# Patient Record
(demographics unavailable — no encounter records)

---

## 2024-12-04 NOTE — REVIEW OF SYSTEMS
[Negative] : Musculoskeletal [Rash] : no rash noted [Ulcer] : no ulcer was seen [Syncope] : no syncope noted [Neuropathy] : no neuropathy [Depression] : no depression [Diabetes] : diabetes mellitus [Abn Vag Bleeding] : abnormal vaginal bleeding [Incontinence] : no incontinence [Fatigue] : no fatigue [Recent Wt Gain ___ Lbs] : no recent weight gain [Recent Wt Loss___ Lbs] : recent weight loss of [unfilled] lbs [Chest Pain] : no chest pain [DIXON] : no dyspnea on exertion [Wheezing] : no wheezing [SOB on Exertion] : no shortness of breath during exertion [Bloody Stools] : no bloody stools [Nausea] : no nausea/vomitting [Muscle Weakness] : no muscle weakness [FreeTextEntry4] : + USI wears pads somewhat worse

## 2024-12-04 NOTE — DISCUSSION/SUMMARY
[Reviewed Clinical Lab Test(s)] : Results of clinical tests were reviewed. [Reviewed Radiology Report(s)] : Radiology reports were reviewed. [Visit Time ___ Minutes] : [unfilled] minutes [Face to Face Time___ Minutes] : with [unfilled] minutes in face to face consultation. [FreeTextEntry1] : Excellent overall prognosis. Has significant USI Sx will refer to urogyn Answered all her questions   Return in 3 months

## 2024-12-04 NOTE — PHYSICAL EXAM
[Absent] : Adnexa(ae): Absent [Normal] : Recto-Vaginal Exam: Normal [FreeTextEntry1] : NAD [de-identified] : LUIS CARLOS [de-identified] : no edema [de-identified] : soft NT/ND  [de-identified] : cuff intact, no lesions small polyp on the R side cauderized [de-identified] : no masses [Fully active, able to carry on all pre-disease performance without restriction] : Status 0 - Fully active, able to carry on all pre-disease performance without restriction

## 2024-12-04 NOTE — HISTORY OF PRESENT ILLNESS
[FreeTextEntry1] : 63 y/o with stage IA2 G2 endometrial cancer  Bere/BSO/PSLNBx on 9/25/23 dMMR (with methylation) p53 WT, no adjuvant therapy.  No complaints

## 2025-03-11 NOTE — PHYSICAL EXAM
[Absent] : Adnexa(ae): Absent [Normal] : Recto-Vaginal Exam: Normal [FreeTextEntry1] : NAD [de-identified] : LUIS CARLOS [de-identified] : no edema [de-identified] : soft NT/ND  [de-identified] : cuff intact, no lesions small polyp on the R side cauderized [de-identified] : no masses

## 2025-03-11 NOTE — REVIEW OF SYSTEMS
[Negative] : Musculoskeletal [Diabetes] : diabetes mellitus [Abn Vag Bleeding] : abnormal vaginal bleeding [Recent Wt Loss___ Lbs] : recent weight loss of [unfilled] lbs [Rash] : no rash noted [Ulcer] : no ulcer was seen [Syncope] : no syncope noted [Neuropathy] : no neuropathy [Depression] : no depression [Incontinence] : no incontinence [Fatigue] : no fatigue [Recent Wt Gain ___ Lbs] : no recent weight gain [Chest Pain] : no chest pain [DIXON] : no dyspnea on exertion [Wheezing] : no wheezing [SOB on Exertion] : no shortness of breath during exertion [Bloody Stools] : no bloody stools [Nausea] : no nausea/vomitting [Muscle Weakness] : no muscle weakness [FreeTextEntry4] : + USI wears pads somewhat worse

## 2025-03-11 NOTE — DISCUSSION/SUMMARY
[Reviewed Clinical Lab Test(s)] : Results of clinical tests were reviewed. [Reviewed Radiology Report(s)] : Radiology reports were reviewed. [Visit Time ___ Minutes] : [unfilled] minutes [Face to Face Time___ Minutes] : with [unfilled] minutes in face to face consultation. [FreeTextEntry1] : At low risk for recurrence, answered all her questions. USI 2/2 bladder neck descent, she is not interested in addressing this at this time. Mammogram ordered, colonoscopy recommended.  Return in 3 months

## 2025-03-11 NOTE — HISTORY OF PRESENT ILLNESS
[FreeTextEntry1] : 65 y/o with stage IA2 G2 endometrial cancer  Bere/BSO/PSLNBx on 9/25/23 dMMR (with methylation) p53 WT, no adjuvant therapy.  No complaints, has some USI Sx almost daily, no nocturia

## 2025-06-10 NOTE — PHYSICAL EXAM
[Absent] : Adnexa(ae): Absent [Normal] : Recto-Vaginal Exam: Normal [FreeTextEntry1] : NAD [de-identified] : LUIS CARLOS [de-identified] : no edema [de-identified] : soft NT/ND  [de-identified] : cuff intact, no lesions small polyp on the R side cauderized [de-identified] : no masses [Fully active, able to carry on all pre-disease performance without restriction] : Status 0 - Fully active, able to carry on all pre-disease performance without restriction

## 2025-06-10 NOTE — DISCUSSION/SUMMARY
[Reviewed Clinical Lab Test(s)] : Results of clinical tests were reviewed. [Reviewed Radiology Report(s)] : Radiology reports were reviewed. [Visit Time ___ Minutes] : [unfilled] minutes [Face to Face Time___ Minutes] : with [unfilled] minutes in face to face consultation. [FreeTextEntry1] : At low risk for recurrence, answered all her questions. USI 2/2 bladder neck descent, she is not interested in addressing this at this time. Mammogram ordered again, colonoscopy recommended.  Return in 3 months

## 2025-06-10 NOTE — HISTORY OF PRESENT ILLNESS
[FreeTextEntry1] : 66 y/o with stage IA2 G2 endometrial cancer  Bere/BSO/PSLNBx on 9/25/23 dMMR (with methylation) p53 WT, no adjuvant therapy.  No complaints, has some USI Sx almost daily, no nocturia